# Patient Record
Sex: MALE | Race: OTHER | HISPANIC OR LATINO | URBAN - METROPOLITAN AREA
[De-identification: names, ages, dates, MRNs, and addresses within clinical notes are randomized per-mention and may not be internally consistent; named-entity substitution may affect disease eponyms.]

---

## 2019-10-15 ENCOUNTER — EMERGENCY (EMERGENCY)
Facility: HOSPITAL | Age: 24
LOS: 1 days | Discharge: HOME | End: 2019-10-15
Attending: EMERGENCY MEDICINE
Payer: SELF-PAY

## 2019-10-15 VITALS
OXYGEN SATURATION: 99 % | RESPIRATION RATE: 118 BRPM | WEIGHT: 169.98 LBS | HEART RATE: 119 BPM | SYSTOLIC BLOOD PRESSURE: 140 MMHG | HEIGHT: 68 IN | DIASTOLIC BLOOD PRESSURE: 81 MMHG | TEMPERATURE: 98 F

## 2019-10-15 PROCEDURE — 93005 ELECTROCARDIOGRAM TRACING: CPT

## 2019-10-15 PROCEDURE — 99283 EMERGENCY DEPT VISIT LOW MDM: CPT

## 2019-10-15 RX ORDER — IBUPROFEN 200 MG
1 TABLET ORAL
Qty: 5 | Refills: 0
Start: 2019-10-15

## 2019-10-15 RX ORDER — METHOCARBAMOL 500 MG/1
1 TABLET, FILM COATED ORAL
Qty: 15 | Refills: 0
Start: 2019-10-15 | End: 2019-10-19

## 2019-10-15 RX ORDER — CYCLOBENZAPRINE HYDROCHLORIDE 10 MG/1
10 TABLET, FILM COATED ORAL ONCE
Refills: 0 | Status: COMPLETED | OUTPATIENT
Start: 2019-10-15 | End: 2019-10-15

## 2019-10-15 RX ORDER — IBUPROFEN 200 MG
600 TABLET ORAL ONCE
Refills: 0 | Status: COMPLETED | OUTPATIENT
Start: 2019-10-15 | End: 2019-10-15

## 2019-10-15 RX ADMIN — CYCLOBENZAPRINE HYDROCHLORIDE 10 MILLIGRAM(S): 10 TABLET, FILM COATED ORAL at 20:24

## 2019-10-15 RX ADMIN — Medication 600 MILLIGRAM(S): at 20:24
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
## 2019-10-15 NOTE — ED PROVIDER NOTE - MUSCULOSKELETAL MINIMAL EXAM
B/l paraspinal muscle spasm in cervical and thoracic spine. No limitations in ROM. Pt is ambulatory without difficulty.

## 2019-10-15 NOTE — ED PROVIDER NOTE - PATIENT PORTAL LINK FT
You can access the FollowMyHealth Patient Portal offered by [facility] by registering at the following website: [email or web address]. By joining "[employer]"’s FollowMyHealth portal, you will also be able to view your health information using other applications (apps) compatible with our system.

## 2019-11-26 NOTE — ED ADULT NURSE NOTE - CAS DISCH ACCOMP BY
PRINCIPAL DISCHARGE DIAGNOSIS  Diagnosis: Syncope  Assessment and Plan of Treatment: Your incision will be without infection. Your heartbeat will remain controlled.   Appointment: follow up with your electrophysiology (EP) doctor in 7-10 days after discharge. Please call the EP office ([phone]) to make appointment.   Incision care (where your cut was made): sugical glue will naturally fall off our skin.  Do not scrub, rub, or pick at the surgical glue. Call your doctor if you have any fever; chills; redness; swelling; increased soreness; warmth or drainage at the incision site.    ID Card: Do carry your ICD Identification (ID) card with you at all times.   Call your doctor immediately if you have hiccups for a long time, fainting, dizziness, lightheadedness, palpitations, chest pain or the same symptoms that you had before the procedure.   You should not have a MRI unless you have a MRI safe device.   IF YOU RECEIVE A SHOCK: • If you receive 1 shock, you must call our EP office. • If you receive 2 or more shocks, you should call 911 and go to ER for further evaluation or treatment by the EP team.      SECONDARY DISCHARGE DIAGNOSES  Diagnosis: HLD (hyperlipidemia)  Assessment and Plan of Treatment: Your LDL cholesterol will be less than 70mg/dL   Continue with your cholesterol medications. Eat a heart healthy diet that is low in saturated fats and salt, and includes whole grains, fruits, vegetables and lean protein; exercise regularly (consult with your physician or cardiologist first); maintain a heart healthy weight. Continue to follow with your primary physician or cardiologist for treatment goals, continue medication, have liver function testing every 3 months as anti lipid medications can cause liver irritation. If you smoke - quit (A resource to help you stop smoking is the [facility] [employer] for Tobacco Control – phone number [phone].).    Diagnosis: HTN (hypertension)  Assessment and Plan of Treatment: Your blood pressure will be controlled.   Continue with your blood pressure medications; eat a heart healthy diet with low salt diet; exercise regularly (consult with your physician or cardiologist first); maintain a heart healthy weight; if you smoke - quit (A resource to help you stop smoking is the [facility]com for [employer] Control – phone number 151-456-9568.); include healthy ways to manage stress. Continue to follow with your primary care physician or cardiologist.    Diagnosis: Diabetes mellitus  Assessment and Plan of Treatment: Your hemoglobin A1C will be between 7-8   Continue to follow with your primary care MD or your endocrinologist.  Follow a heart healthy diabetic diet. If you check your fingerstick glucose at home, call your MD if it is greater than 250mg/dL on 2 occasions or less than 100mg/dL on 2 occasions. Know signs of low blood sugar, such as: dizziness, shakiness, sweating, confusion, hunger, nervousness-drink 4 ounces apple juice if occurs and call your doctor. Know early signs of high blood sugar, such as: frequent urination, increased thirst, blurry vision, fatigue, headache - call your doctor if this occurs. Follow with other practitioners to care for your diabetes, such as ophthamologist and podiatrist. Self/Significant other

## 2023-06-10 NOTE — ED ADULT NURSE NOTE - NSFALLRSKASSESASSIST_ED_ALL_ED
EEG preliminary read (not final) on the initial recording hour(s) = x 4 hr until 1424    2 typical focal seizures captured last occurred 08:40 am, no seizures since. Final report to follow tomorrow morning after completion of study.    [facility] EEG Reading Room Ph#: [phone]  Epilepsy Answering Service after 5PM and before 8:30AM: Ph#: [phone] no